# Patient Record
Sex: MALE | Race: WHITE | URBAN - METROPOLITAN AREA
[De-identification: names, ages, dates, MRNs, and addresses within clinical notes are randomized per-mention and may not be internally consistent; named-entity substitution may affect disease eponyms.]

---

## 2017-07-25 ENCOUNTER — TELEPHONE (OUTPATIENT)
Dept: CARDIOLOGY CLINIC | Age: 62
End: 2017-07-25

## 2017-07-28 ENCOUNTER — TELEPHONE (OUTPATIENT)
Dept: CARDIOLOGY CLINIC | Age: 62
End: 2017-07-28

## 2017-08-08 ENCOUNTER — OFFICE VISIT (OUTPATIENT)
Dept: CARDIOLOGY CLINIC | Age: 62
End: 2017-08-08

## 2017-08-08 VITALS
OXYGEN SATURATION: 96 % | WEIGHT: 265.8 LBS | DIASTOLIC BLOOD PRESSURE: 62 MMHG | BODY MASS INDEX: 37.07 KG/M2 | HEART RATE: 66 BPM | SYSTOLIC BLOOD PRESSURE: 116 MMHG

## 2017-08-08 DIAGNOSIS — I25.10 CAD IN NATIVE ARTERY: Primary | ICD-10-CM

## 2017-08-08 DIAGNOSIS — E78.00 HYPERCHOLESTEROLEMIA: ICD-10-CM

## 2017-08-08 DIAGNOSIS — I21.11 ACUTE ST ELEVATION MYOCARDIAL INFARCTION (STEMI) INVOLVING RIGHT CORONARY ARTERY (HCC): ICD-10-CM

## 2017-08-08 PROCEDURE — 99214 OFFICE O/P EST MOD 30 MIN: CPT | Performed by: NURSE PRACTITIONER

## 2017-08-25 ENCOUNTER — OFFICE VISIT (OUTPATIENT)
Dept: CARDIOLOGY CLINIC | Age: 62
End: 2017-08-25

## 2017-08-25 VITALS
HEART RATE: 64 BPM | DIASTOLIC BLOOD PRESSURE: 70 MMHG | WEIGHT: 266.2 LBS | SYSTOLIC BLOOD PRESSURE: 120 MMHG | BODY MASS INDEX: 37.13 KG/M2

## 2017-08-25 DIAGNOSIS — I25.10 CORONARY ARTERY DISEASE INVOLVING NATIVE CORONARY ARTERY OF NATIVE HEART WITHOUT ANGINA PECTORIS: Primary | ICD-10-CM

## 2017-08-25 DIAGNOSIS — E78.00 HYPERCHOLESTEROLEMIA: ICD-10-CM

## 2017-08-25 PROCEDURE — 99213 OFFICE O/P EST LOW 20 MIN: CPT | Performed by: INTERNAL MEDICINE

## 2017-08-25 RX ORDER — FEBUXOSTAT 80 MG/1
80 TABLET, FILM COATED ORAL
COMMUNITY
Start: 2017-08-11 | End: 2018-03-07

## 2017-08-28 ENCOUNTER — HOSPITAL ENCOUNTER (OUTPATIENT)
Dept: NON INVASIVE DIAGNOSTICS | Age: 62
Discharge: HOME | End: 2017-08-28
Attending: INTERNAL MEDICINE | Admitting: INTERNAL MEDICINE

## 2017-08-28 DIAGNOSIS — I21.11 ACUTE ST ELEVATION MYOCARDIAL INFARCTION (STEMI) INVOLVING RIGHT CORONARY ARTERY (HCC): Primary | ICD-10-CM

## 2017-08-28 DIAGNOSIS — I21.3 ST ELEVATION (STEMI) MYOCARDIAL INFARCTION (HCC): ICD-10-CM

## 2017-08-29 ENCOUNTER — TELEPHONE (OUTPATIENT)
Dept: CARDIOLOGY CLINIC | Age: 62
End: 2017-08-29

## 2017-09-01 ENCOUNTER — TELEPHONE (OUTPATIENT)
Dept: CARDIOLOGY CLINIC | Age: 62
End: 2017-09-01

## 2017-09-05 DIAGNOSIS — I25.10 CORONARY ARTERY DISEASE INVOLVING NATIVE CORONARY ARTERY OF NATIVE HEART, ANGINA PRESENCE UNSPECIFIED: Primary | ICD-10-CM

## 2017-09-05 RX ORDER — ROSUVASTATIN CALCIUM 5 MG/1
5 TABLET, COATED ORAL DAILY
Qty: 30 TABLET | Refills: 2 | Status: SHIPPED | OUTPATIENT
Start: 2017-09-05 | End: 2017-12-18 | Stop reason: SDUPTHER

## 2017-11-01 ENCOUNTER — HOSPITAL ENCOUNTER (OUTPATIENT)
Dept: CARDIAC REHAB | Age: 62
Discharge: HOME | End: 2017-11-30
Attending: INTERNAL MEDICINE | Admitting: INTERNAL MEDICINE

## 2017-11-21 RX ORDER — LISINOPRIL 2.5 MG/1
TABLET ORAL
Qty: 30 TABLET | Refills: 2 | Status: SHIPPED | OUTPATIENT
Start: 2017-11-21 | End: 2017-12-01 | Stop reason: SDUPTHER

## 2017-12-01 ENCOUNTER — OFFICE VISIT (OUTPATIENT)
Dept: CARDIOLOGY CLINIC | Age: 62
End: 2017-12-01

## 2017-12-01 VITALS
DIASTOLIC BLOOD PRESSURE: 70 MMHG | SYSTOLIC BLOOD PRESSURE: 130 MMHG | BODY MASS INDEX: 38.63 KG/M2 | WEIGHT: 277 LBS | HEART RATE: 88 BPM

## 2017-12-01 DIAGNOSIS — I25.10 CORONARY ARTERY DISEASE INVOLVING NATIVE CORONARY ARTERY OF NATIVE HEART WITHOUT ANGINA PECTORIS: Primary | ICD-10-CM

## 2017-12-01 DIAGNOSIS — I10 ESSENTIAL HYPERTENSION: ICD-10-CM

## 2017-12-01 DIAGNOSIS — E78.49 OTHER HYPERLIPIDEMIA: ICD-10-CM

## 2017-12-01 PROCEDURE — 99214 OFFICE O/P EST MOD 30 MIN: CPT | Performed by: INTERNAL MEDICINE

## 2017-12-01 RX ORDER — METOPROLOL SUCCINATE 50 MG/1
50 TABLET, EXTENDED RELEASE ORAL DAILY
Qty: 90 TABLET | Refills: 3 | Status: SHIPPED | OUTPATIENT
Start: 2017-12-01 | End: 2018-01-04 | Stop reason: SDUPTHER

## 2017-12-01 RX ORDER — LISINOPRIL 5 MG/1
5 TABLET ORAL DAILY
Qty: 90 TABLET | Refills: 3 | Status: SHIPPED | OUTPATIENT
Start: 2017-12-01 | End: 2018-01-04 | Stop reason: SDUPTHER

## 2017-12-01 NOTE — PROGRESS NOTES
Respiratory:  Clear lungs  GI: abd soft  Skin: Warm, dry. No rashes  Neuro/Psych: Alert and oriented x 3. Appropriate behavior  Ext:  No c/c. No edema  Pulses:  2+ radial and carotid B    CBC:   Lab Results   Component Value Date    WBC 7.6 2017    HGB 12.5 (L) 2017    HCT 36.8 (L) 2017    MCV 98.7 2017     2017     BMP:  Lab Results   Component Value Date    CREATININE 0.7 (L) 2017    BUN 13 2017     2017    K 4.0 2017     2017    CO2 25 2017     Mag:   Lab Results   Component Value Date    MG 1.90 2017     LIVER PROFILE:   Lab Results   Component Value Date    ALT 11 2017    AST 17 2017    ALKPHOS 47 2017    BILITOT 0.5 2017     PT/INR:   Lab Results   Component Value Date    INR 1.04 2017    PROTIME 11.7 2017     BNP:  No results found for: BNP  LIPIDS:  No components found for: CHLPL  Lab Results   Component Value Date    TRIG 248 (H) 2017     Lab Results   Component Value Date    HDL 36 (L) 2017     Lab Results   Component Value Date    LDLCALC 87 2017     Lab Results   Component Value Date    LABVLDL 50 2017     TSH:No results found for: TSH, M2OGBQO, E0KGCTT, THYROIDAB    IMAGIN2017 Coronary angiogram  Angiographic Findings:  Left Main:  Mild irregularities. Left Anterior Descending:  Proximal LAD 70% stenosis. Mid, diffuse 50-60% stenosis. Circumflex:  Diffuse plaque. Proximal 30% narrowing. .  Ramus Intermedius: No significant plaque. Right Coronary:  Mid 99%/subtotal occlusion with thrombus. Distal 30% stenosis just proximal to the RPL/PDA bifurcation. Proximal PDA with 30% stenosis. Left Ventriculogram:  LVEF 45-50% with minimal inferior wall hypokinesis. Femoral Artery:  Mild plaque  Intervention:  Anticoagulation with Heparin and aggrastat was used (see nursing notes for details).   A 6 Fr JR 4.0 guide catheter was then advanced over

## 2017-12-18 ENCOUNTER — TELEPHONE (OUTPATIENT)
Dept: CARDIOLOGY CLINIC | Age: 62
End: 2017-12-18

## 2017-12-18 DIAGNOSIS — I25.10 CORONARY ARTERY DISEASE INVOLVING NATIVE CORONARY ARTERY OF NATIVE HEART, ANGINA PRESENCE UNSPECIFIED: ICD-10-CM

## 2017-12-18 RX ORDER — ROSUVASTATIN CALCIUM 5 MG/1
5 TABLET, COATED ORAL DAILY
Qty: 30 TABLET | Refills: 5 | Status: SHIPPED | OUTPATIENT
Start: 2017-12-18 | End: 2018-01-04 | Stop reason: SDUPTHER

## 2017-12-18 NOTE — TELEPHONE ENCOUNTER
Rx sent to [facility] [address]. LVM stating it was sent and requesting he go get his labs done that were ordered by Dr. Ilana Briseno on 12/1. Reminded him that he needs to be fasting.

## 2018-01-04 DIAGNOSIS — I25.10 CORONARY ARTERY DISEASE INVOLVING NATIVE CORONARY ARTERY OF NATIVE HEART, ANGINA PRESENCE UNSPECIFIED: ICD-10-CM

## 2018-01-04 RX ORDER — ROSUVASTATIN CALCIUM 5 MG/1
5 TABLET, COATED ORAL DAILY
Qty: 30 TABLET | Refills: 5 | Status: SHIPPED | OUTPATIENT
Start: 2018-01-04 | End: 2018-01-15 | Stop reason: SDUPTHER

## 2018-01-04 RX ORDER — LISINOPRIL 5 MG/1
5 TABLET ORAL DAILY
Qty: 90 TABLET | Refills: 3 | Status: SHIPPED | OUTPATIENT
Start: 2018-01-04

## 2018-01-04 RX ORDER — METOPROLOL SUCCINATE 50 MG/1
50 TABLET, EXTENDED RELEASE ORAL DAILY
Qty: 90 TABLET | Refills: 3 | Status: SHIPPED | OUTPATIENT
Start: 2018-01-04

## 2018-01-12 ENCOUNTER — TELEPHONE (OUTPATIENT)
Dept: CARDIOLOGY CLINIC | Age: 63
End: 2018-01-12

## 2018-01-15 ENCOUNTER — TELEPHONE (OUTPATIENT)
Dept: CARDIOLOGY CLINIC | Age: 63
End: 2018-01-15

## 2018-01-15 DIAGNOSIS — I25.10 CORONARY ARTERY DISEASE INVOLVING NATIVE CORONARY ARTERY OF NATIVE HEART, ANGINA PRESENCE UNSPECIFIED: ICD-10-CM

## 2018-01-15 RX ORDER — ROSUVASTATIN CALCIUM 5 MG/1
5 TABLET, COATED ORAL DAILY
Qty: 90 TABLET | Refills: 3 | Status: SHIPPED | OUTPATIENT
Start: 2018-01-15 | End: 2018-12-24 | Stop reason: SDUPTHER

## 2018-01-15 NOTE — TELEPHONE ENCOUNTER
Pt called and stated that he will call his insurance to find out which mail order pharmacy they would like him to use and call us back.

## 2018-02-26 ENCOUNTER — TELEPHONE (OUTPATIENT)
Dept: CARDIOLOGY CLINIC | Age: 63
End: 2018-02-26

## 2018-02-26 NOTE — TELEPHONE ENCOUNTER
Pt takes Brilinta for a stent placed in July    Wants to have knee replacement surgery and stop the Brilinta    Dr Fransico Kendall at [facility]    Please call pt to advise

## 2018-02-27 NOTE — TELEPHONE ENCOUNTER
Spoke with patient, informed him that he cannot have his elective knee replacement until after being on Brilinta without interruption for at least a year post stent placement. He verbalizes understanding of this, and will try cortisone shots for the time being to see if this helps.   He will call our office if he decides to proceed with knee replacement-which would be scheduled some time in August.

## 2018-03-07 ENCOUNTER — OFFICE VISIT (OUTPATIENT)
Dept: CARDIOLOGY CLINIC | Age: 63
End: 2018-03-07

## 2018-03-07 VITALS
HEART RATE: 58 BPM | SYSTOLIC BLOOD PRESSURE: 110 MMHG | BODY MASS INDEX: 38.19 KG/M2 | DIASTOLIC BLOOD PRESSURE: 54 MMHG | WEIGHT: 272.8 LBS | HEIGHT: 71 IN

## 2018-03-07 DIAGNOSIS — R06.02 SOB (SHORTNESS OF BREATH) ON EXERTION: ICD-10-CM

## 2018-03-07 DIAGNOSIS — I25.10 CORONARY ARTERY DISEASE INVOLVING NATIVE CORONARY ARTERY OF NATIVE HEART, ANGINA PRESENCE UNSPECIFIED: Primary | ICD-10-CM

## 2018-03-07 DIAGNOSIS — I10 ESSENTIAL HYPERTENSION: ICD-10-CM

## 2018-03-07 DIAGNOSIS — E78.00 HYPERCHOLESTEROLEMIA: ICD-10-CM

## 2018-03-07 PROCEDURE — 99214 OFFICE O/P EST MOD 30 MIN: CPT | Performed by: NURSE PRACTITIONER

## 2018-03-07 RX ORDER — COLCHICINE 0.6 MG/1
0.6 TABLET ORAL DAILY
COMMUNITY

## 2018-03-07 RX ORDER — FEBUXOSTAT 80 MG/1
80 TABLET, FILM COATED ORAL DAILY
COMMUNITY

## 2018-03-08 NOTE — PROGRESS NOTES
CC/HPI:  59 y.o. patient of Dr. Rodolfo Sahu with CAD, HTN, HLD and hx DVT who has c/o SOB and indigestion (may be his angina equivalent) . States he had SOB and PND the last 2 nights. He had Indigestion on Saturday which lasted 10 minutes with associated nausea which is similar symptoms he had prior to stent in July. He denies exertional cp, LH/dizziness, palpitations, syncope, LE edema or GI/ bleeding. He did not have Brilinta or crestor for 3 weeks in January d/t issues with the mail order pharmacy. Past Medical History:   Diagnosis Date    CAD (coronary artery disease) 07/23/2017    mid RCA 4.0 x 18 mm Xience BRIAN    Cellulitis     [facility])     Gout     Kidney stones      Past Surgical History:   Procedure Laterality Date    HERNIA REPAIR      TONSILLECTOMY       No family history on file. Social History   Substance Use Topics    Smoking status: Never Smoker    Smokeless tobacco: Never Used    Alcohol use Yes      Comment: socially     Allergies:Patient has no known allergies. Review of Systems  General: No changes in weight, fatigue, or night sweats. HEENT: No blurry or decreased vision. No changes in hearing, nasal discharge or sore throat. Cardiovascular:  See HPI. Respiratory: No cough, hemoptysis, or wheezing. No history of asthma. Gastrointestinal:  No abdominal pain, hematochezia, melana, constipation, diarrhea, or history of GI ulcers. Genito-Urinary: No dysuria or hematuria. No urgency or polyuria. Musculoskeletal:  No complaints of joint pain, joint swelling or muscular weakness/soreness. Neurological:  No dizziness, headaches, numbness/tingling, speech problems or weakness. No history of a stroke or TIA. Psychological:  No anxiety or depression. Hematological and Lymphatic: No abnormal bleeding or bruising, blood clots, jaundice or swollen lymph nodes.   Endocrine:   No malaise/lethargy, palpitations, polydipsia/polyuria, temperature intolerance or

## 2018-03-13 ENCOUNTER — HOSPITAL ENCOUNTER (OUTPATIENT)
Dept: NUCLEAR MEDICINE | Age: 63
Discharge: HOME | End: 2018-03-13
Attending: INTERNAL MEDICINE | Admitting: INTERNAL MEDICINE

## 2018-03-13 DIAGNOSIS — I25.10 ATHEROSCLEROTIC HEART DISEASE OF NATIVE CORONARY ARTERY WITHOUT ANGINA PECTORIS: ICD-10-CM

## 2018-03-13 LAB
LV EF: 58 %
LVEF MODALITY: NORMAL

## 2018-03-13 RX ORDER — SODIUM CHLORIDE 0.9 % (FLUSH) 0.9 %
10 SYRINGE (ML) INJECTION PRN
Status: DISCONTINUED | OUTPATIENT
Start: 2018-03-13 | End: 2018-03-14 | Stop reason: HOSPADM

## 2018-03-13 RX ADMIN — Medication 10 ML: at 14:15

## 2018-03-13 RX ADMIN — Medication 10 ML: at 15:17

## 2018-03-14 ENCOUNTER — TELEPHONE (OUTPATIENT)
Dept: CARDIOLOGY CLINIC | Age: 63
End: 2018-03-14

## 2018-08-02 ENCOUNTER — OFFICE VISIT (OUTPATIENT)
Dept: CARDIOLOGY CLINIC | Age: 63
End: 2018-08-02

## 2018-08-02 VITALS
HEART RATE: 74 BPM | BODY MASS INDEX: 38.19 KG/M2 | WEIGHT: 270 LBS | DIASTOLIC BLOOD PRESSURE: 60 MMHG | OXYGEN SATURATION: 97 % | SYSTOLIC BLOOD PRESSURE: 132 MMHG

## 2018-08-02 DIAGNOSIS — E78.49 OTHER HYPERLIPIDEMIA: ICD-10-CM

## 2018-08-02 DIAGNOSIS — I25.10 CORONARY ARTERY DISEASE INVOLVING NATIVE CORONARY ARTERY OF NATIVE HEART WITHOUT ANGINA PECTORIS: Primary | ICD-10-CM

## 2018-08-02 DIAGNOSIS — Z01.810 PREOP CARDIOVASCULAR EXAM: ICD-10-CM

## 2018-08-02 DIAGNOSIS — I10 ESSENTIAL HYPERTENSION: ICD-10-CM

## 2018-08-02 PROCEDURE — 99214 OFFICE O/P EST MOD 30 MIN: CPT | Performed by: INTERNAL MEDICINE

## 2018-08-02 RX ORDER — TIZANIDINE 4 MG/1
4 TABLET ORAL DAILY
COMMUNITY

## 2018-08-02 RX ORDER — HYDROCODONE BITARTRATE AND ACETAMINOPHEN 5; 325 MG/1; MG/1
1 TABLET ORAL 2 TIMES DAILY
COMMUNITY

## 2018-08-02 RX ORDER — PREDNISONE 1 MG/1
5 TABLET ORAL DAILY
COMMUNITY

## 2018-08-02 RX ORDER — FUROSEMIDE 20 MG/1
20 TABLET ORAL PRN
COMMUNITY

## 2018-08-02 NOTE — LETTER
[address] [place] [address] Cardiology [facility]  [address] [address]  Phone: [phone]  Fax: [phone]    Keri Schwab MD        August 2, 2018          Yohan Gallego YOB: 1955 is cleared from a cardiac standpoint and is considered to be an acceptable risk for his planned surgical procedure. If there are any questions, please feel free to contact my office at [phone].       Sincerely,                Keri Schwab MD

## 2018-08-02 NOTE — PROGRESS NOTES
(appearance): Well devel. No distress. Eyes: Anicteric. EOMI  Ears/Nose/Mouth/Thorat: No cyanosis  CV: RRR. No m/r/g   Respiratory:  Clear B  GI: Abd soft. No peritoneal signs  Skin: Warm, dry. No rashes  Neuro/Psych: Alert and oriented x 3. Appropriate behavior  Ext:  No c/c. Bilateral edema  Pulses:  2+. No carotid bruits    Lab Results   Component Value Date    WBC 7.6 07/24/2017    HGB 12.5 (L) 07/24/2017    HCT 36.8 (L) 07/24/2017    MCV 98.7 07/24/2017     07/24/2017     Lab Results   Component Value Date     07/24/2017    K 4.0 07/24/2017     07/24/2017    CO2 25 07/24/2017    BUN 13 07/24/2017    CREATININE 0.7 (L) 07/24/2017    GLUCOSE 105 (H) 07/24/2017    CALCIUM 8.5 07/24/2017    PROT 5.6 (L) 07/24/2017    LABALBU 3.3 (L) 07/24/2017    BILITOT 0.5 07/24/2017    ALKPHOS 47 07/24/2017    AST 17 07/24/2017    ALT 11 07/24/2017    LABGLOM >60 07/24/2017    GFRAA >60 07/24/2017    AGRATIO 1.4 07/24/2017    GLOB 2.3 07/24/2017         Lab Results   Component Value Date    CHOL 173 07/24/2017     Lab Results   Component Value Date    TRIG 248 (H) 07/24/2017     Lab Results   Component Value Date    HDL 36 (L) 07/24/2017     Lab Results   Component Value Date    LDLCALC 87 07/24/2017     Lab Results   Component Value Date    LABVLDL 50 07/24/2017 8/2/18 ECG: NSR    Stress Test 3/13/18 (Nuclear)       1.  NORMAL PHARMACOLOGIC SPECT STRESS AND REST MYOCARDIAL   PERFUSION SCAN WITH NO EVIDENCE OF PHARMACOLOGIC-INDUCED   MYOCARDIAL ISCHEMIA. 2.  NORMAL-APPEARING LEFT VENTRICULAR WALL MOTION AND EJECTION   FRACTION AT REST. Stress Test 3/13/18 (EKG)   The patient was stressed using the Lexiscan protocol. The patient was   stressed for 1 minute to a heart rate of 71/bpm . The appropriate amount of   Lexiscan was administered intravenously followed by a nuclear tracer.  Test   was stopped due to completion of protocol.      Impression:   Negative for Ischemia.         7/24/2017 mouth daily, Disp: , Rfl:     rosuvastatin (CRESTOR) 5 MG tablet, Take 1 tablet by mouth daily, Disp: 90 tablet, Rfl: 3    ticagrelor (BRILINTA) 90 MG TABS tablet, Take 1 tablet by mouth 2 times daily, Disp: 180 tablet, Rfl: 3    metoprolol succinate (TOPROL XL) 50 MG extended release tablet, Take 1 tablet by mouth daily, Disp: 90 tablet, Rfl: 3    lisinopril (PRINIVIL;ZESTRIL) 5 MG tablet, Take 1 tablet by mouth daily, Disp: 90 tablet, Rfl: 3    aspirin 81 MG EC tablet, Take 1 tablet by mouth daily, Disp: 30 tablet, Rfl: 3    Assessment:  59 y.o. here for pre-op eval and CAD. 1. Coronary artery disease involving native coronary artery of native heart without angina pectoris    2. Essential hypertension    3. Other hyperlipidemia    4. Preop cardiovascular exam      Plan:  -Cont current cv meds. However, when runs out of ticagrelor, start the lower 60 mg bid dosing.  -Cont crestor  -Check lipids; increase if LDL over 70  -See me or Yasmin in 6 months    Larry Aviles MD, [facility]

## 2018-12-24 DIAGNOSIS — I25.10 CORONARY ARTERY DISEASE INVOLVING NATIVE CORONARY ARTERY OF NATIVE HEART, ANGINA PRESENCE UNSPECIFIED: ICD-10-CM

## 2018-12-27 RX ORDER — ROSUVASTATIN CALCIUM 5 MG/1
TABLET, COATED ORAL
Qty: 90 TABLET | Refills: 3 | Status: SHIPPED | OUTPATIENT
Start: 2018-12-27